# Patient Record
Sex: MALE | Race: WHITE
[De-identification: names, ages, dates, MRNs, and addresses within clinical notes are randomized per-mention and may not be internally consistent; named-entity substitution may affect disease eponyms.]

---

## 2018-01-30 NOTE — CT
CT OF ABDOMEN AND PELVIS PERFORMED WITH INTRAVENOUS CONTRAST ENHANCEMENT

1/30/18

 

HISTORY: 

Fall with increasing right flank pain. Fall occurred Sunday night. 

 

The lung bases are clear. 

 

There are diffuse fatty changes of the liver The spleen is within normal limits of size. Evidence of 
a gastric bypass is noted. The pancreas region is unremarkable. The gallbladder has been removed. 

 

Right and left adrenal glands and right and left kidneys are normal in size. No signs of obstruction.
 There is some minimal prominence to both ureters but this is probably on the basis of a slightly dis
tended bladder. There is no free fluid. There is some small nonspecific periaortic and aortocaval nod
es which are nonspecific. They are slightly numerous than typically seen and may be reactive. 

 

CT OF PELVIS PERFORMED WITH CONTRAST ENHANCEMENT:

Prostate does not appear enlarged. I do not appreciate any significant pelvis lymphadenopathy or mass
. The appendix is more retrocecal in location. It is normal in size. 

 

CT OF LUMBAR SPINE:

Unremarkable. 

 

IMPRESSION:  

1.      Fatty changes of the liver. 

2.      Gastric bypass and postop cholecystectomy change. 

3.      No acute abnormalities of the abdomen or pelvis. 

 

POS: [facility]

## 2018-04-16 NOTE — CT
NONCONTRAST CT HEAD:

 

Date: 4-16-18 

 

History: Trauma. Patient fell last night at 1900 hours after becoming dizzy and falling backwards. 

 

Comparison: 5-11-12

 

FINDINGS: 

There is no evidence of a hemorrhage, acute infarction, mass effect, or midline shift. Low density fo
cus in the left lentiform nucleus is seen and was also seen on the prior study which is likely relate
d to remote lacunar infarction. There is cerebral and cerebellar volume loss similar to the prior exa
m. Ventricular system is normal in size, shape, and position for the degree of focal atrophy. Mucosal
 thickening is scattered within the ethmoidal air cells including right sphenoid sinus. There is a de
fect in the left medial orbital wall which is not seen on the prior study suggesting intervening post
traumatic deformity. There is also irregularity at the inferior aspect of the left orbit, also probab
ly related to prior orbital floor fracture. Mastoid air cells are clear. No acute calvarial fracture 
is visualized. No other interval change. 

 

IMPRESSION: 

1. No acute intracranial abnormality is demonstrated. 

2. Cerebral and cerebellar volume loss similar to prior exam. 

3. Remote lacunar infarction left lentiform nucleus. 

4. Sinus disease with findings likely related to remote injury involving the left medial orbital wall
 and orbital floor. 

 

POS: [facility]

## 2018-04-16 NOTE — RAD
SINGLE VIEW OF THE CHEST AND RIGHT RIB SERIES:

 

HISTORY:  

Fall last night with right rib pain.

 

FINDINGS: 

A single view of the chest and multiple views of the right ribs were performed.  The cardiomediastina
l silhouette is normal in size.  The patient is status post sternotomy.  There is no evidence of cons
olidation, mass, pneumothorax, or pleural effusion.

 

No displaced right rib fracture is appreciated.  

 

IMPRESSION: 

1.  No evidence of a displaced right rib fracture.

 

2.  No evidence of acute cardiopulmonary disease.

 

POS: [facility]

## 2018-04-16 NOTE — RAD
FOUR VIEWS OF THE RIGHT KNEE:

 

COMPARISON: 

None.

 

HISTORY: 

Right knee pain with fall last night.

 

FINDINGS: 

Four views of the right knee show no evidence of acute fracture or dislocation.  No significant degen
erative changes are seen.  No knee effusion is seen.

 

IMPRESSION: 

No evidence of acute osseous abnormality.

 

POS: [facility]

## 2018-09-13 NOTE — RAD
LEFT ANKLE 3 VIEWS:

 

Date:  09/13/18 

 

HISTORY:  

Injury. 

 

COMPARISON:  

None. 

 

FINDINGS:

Small mike of bone along the dorsal aspect of the talar neck. There is also abnormal cortical step-o
ff of the anterior margin of the tibial plafond. There is a moderate size plantar and small dorsal ca
lcaneal spur. Evidence of old medial ligamentous injury. There is extensive soft tissue swelling. 

 

IMPRESSION: 

1.  Concern for possible capsular avulsion of the talar neck. 

2.  Concern for possible fracture of the anterior margin of the tibial plafond. 

3.  Extensive soft tissue swelling. 

 

 

POS: [facility]

## 2018-09-13 NOTE — RAD
LEFT FOOT 3 VIEWS:

 

HISTORY: 

Injury.  Twisted ankle last night.

 

COMPARISON: 

None.

 

FINDINGS: 

No acute displaced fracture or malalignment.  Limited evaluation of the Lisfranc interval appears geovanni
ntained.

 

There is also a fracture of the anterior margin of the tibial plafond.  Likely a dorsal avulsion frac
ture of the talar neck and subcapsular injury.

 

IMPRESSION: 

1.  Likely a capsular avulsion injury of the talar neck.

 

2.  Although better seen on the ankle radiographs and not the foot radiographs, there was concern for
 a possible fracture of the anterior margin of the tibial plafond, although not well seen on this exa
m.  It could be a prominent osteophyte.

 

POS: [facility]

## 2019-06-13 ENCOUNTER — HOSPITAL ENCOUNTER (OUTPATIENT)
Dept: HOSPITAL 92 - SLEEPLAB | Age: 54
Discharge: HOME | End: 2019-06-13
Attending: FAMILY MEDICINE
Payer: COMMERCIAL

## 2019-06-13 DIAGNOSIS — R53.83: ICD-10-CM

## 2019-06-13 DIAGNOSIS — I10: ICD-10-CM

## 2019-06-13 DIAGNOSIS — I25.10: ICD-10-CM

## 2019-06-13 DIAGNOSIS — G47.33: Primary | ICD-10-CM

## 2019-06-13 DIAGNOSIS — R06.83: ICD-10-CM

## 2019-06-13 PROCEDURE — 95806 SLEEP STUDY UNATT&RESP EFFT: CPT

## 2019-06-25 ENCOUNTER — HOSPITAL ENCOUNTER (OUTPATIENT)
Dept: HOSPITAL 92 - CTENTCT | Age: 54
Discharge: HOME | End: 2019-06-25
Attending: OTOLARYNGOLOGY
Payer: COMMERCIAL

## 2019-06-25 DIAGNOSIS — J01.81: Primary | ICD-10-CM

## 2019-06-25 PROCEDURE — 70486 CT MAXILLOFACIAL W/O DYE: CPT

## 2019-08-14 ENCOUNTER — HOSPITAL ENCOUNTER (OUTPATIENT)
Dept: HOSPITAL 92 - SLEEPLAB | Age: 54
Discharge: HOME | End: 2019-08-14
Attending: FAMILY MEDICINE
Payer: COMMERCIAL

## 2019-08-14 DIAGNOSIS — I10: ICD-10-CM

## 2019-08-14 DIAGNOSIS — I25.10: ICD-10-CM

## 2019-08-14 DIAGNOSIS — R53.83: ICD-10-CM

## 2019-08-14 DIAGNOSIS — G47.33: Primary | ICD-10-CM

## 2019-08-14 PROCEDURE — 95811 POLYSOM 6/>YRS CPAP 4/> PARM: CPT
